# Patient Record
Sex: MALE | Race: WHITE | NOT HISPANIC OR LATINO | URBAN - METROPOLITAN AREA
[De-identification: names, ages, dates, MRNs, and addresses within clinical notes are randomized per-mention and may not be internally consistent; named-entity substitution may affect disease eponyms.]

---

## 2024-07-29 ENCOUNTER — APPOINTMENT (OUTPATIENT)
Age: 44
Setting detail: DERMATOLOGY
End: 2024-07-29

## 2024-07-29 ENCOUNTER — APPOINTMENT (RX ONLY)
Age: 44
Setting detail: DERMATOLOGY
End: 2024-07-29

## 2024-07-29 VITALS
SYSTOLIC BLOOD PRESSURE: 142 MMHG | DIASTOLIC BLOOD PRESSURE: 89 MMHG | SYSTOLIC BLOOD PRESSURE: 142 MMHG | DIASTOLIC BLOOD PRESSURE: 89 MMHG

## 2024-07-29 DIAGNOSIS — D22 MELANOCYTIC NEVI: ICD-10-CM

## 2024-07-29 DIAGNOSIS — F17.200 NICOTINE DEPENDENCE, UNSPECIFIED, UNCOMPLICATED: ICD-10-CM

## 2024-07-29 DIAGNOSIS — L82.1 OTHER SEBORRHEIC KERATOSIS: ICD-10-CM

## 2024-07-29 DIAGNOSIS — D485 NEOPLASM OF UNCERTAIN BEHAVIOR OF SKIN: ICD-10-CM

## 2024-07-29 DIAGNOSIS — D18.0 HEMANGIOMA: ICD-10-CM

## 2024-07-29 DIAGNOSIS — L81.4 OTHER MELANIN HYPERPIGMENTATION: ICD-10-CM

## 2024-07-29 PROBLEM — D22.61 MELANOCYTIC NEVI OF RIGHT UPPER LIMB, INCLUDING SHOULDER: Status: ACTIVE | Noted: 2024-07-29

## 2024-07-29 PROBLEM — D22.62 MELANOCYTIC NEVI OF LEFT UPPER LIMB, INCLUDING SHOULDER: Status: ACTIVE | Noted: 2024-07-29

## 2024-07-29 PROBLEM — D22.5 MELANOCYTIC NEVI OF TRUNK: Status: ACTIVE | Noted: 2024-07-29

## 2024-07-29 PROBLEM — D22.71 MELANOCYTIC NEVI OF RIGHT LOWER LIMB, INCLUDING HIP: Status: ACTIVE | Noted: 2024-07-29

## 2024-07-29 PROBLEM — D22.72 MELANOCYTIC NEVI OF LEFT LOWER LIMB, INCLUDING HIP: Status: ACTIVE | Noted: 2024-07-29

## 2024-07-29 PROBLEM — D48.5 NEOPLASM OF UNCERTAIN BEHAVIOR OF SKIN: Status: ACTIVE | Noted: 2024-07-29

## 2024-07-29 PROBLEM — D18.01 HEMANGIOMA OF SKIN AND SUBCUTANEOUS TISSUE: Status: ACTIVE | Noted: 2024-07-29

## 2024-07-29 PROCEDURE — 11102 TANGNTL BX SKIN SINGLE LES: CPT

## 2024-07-29 PROCEDURE — ? BIOPSY BY SHAVE METHOD

## 2024-07-29 PROCEDURE — ? SMOKING CESSATION COUNSELING

## 2024-07-29 PROCEDURE — 99203 OFFICE O/P NEW LOW 30 MIN: CPT | Mod: 25

## 2024-07-29 PROCEDURE — ? COUNSELING

## 2024-07-29 PROCEDURE — 11103 TANGNTL BX SKIN EA SEP/ADDL: CPT

## 2024-07-29 ASSESSMENT — LOCATION DETAILED DESCRIPTION DERM
LOCATION DETAILED: LEFT PROXIMAL PRETIBIAL REGION
LOCATION DETAILED: RIGHT PROXIMAL DORSAL FOREARM
LOCATION DETAILED: LEFT ANTERIOR PROXIMAL THIGH
LOCATION DETAILED: PERIUMBILICAL SKIN
LOCATION DETAILED: RIGHT SUPERIOR UPPER BACK
LOCATION DETAILED: RIGHT MEDIAL SUPERIOR CHEST
LOCATION DETAILED: LEFT DISTAL DORSAL FOREARM
LOCATION DETAILED: LEFT PROXIMAL PRETIBIAL REGION
LOCATION DETAILED: RIGHT DISTAL POSTERIOR UPPER ARM
LOCATION DETAILED: LEFT ELBOW
LOCATION DETAILED: RIGHT MEDIAL ELBOW
LOCATION DETAILED: LEFT MEDIAL INFERIOR CHEST
LOCATION DETAILED: LEFT MID-UPPER BACK
LOCATION DETAILED: RIGHT MEDIAL INFERIOR CHEST
LOCATION DETAILED: LEFT INFERIOR UPPER BACK
LOCATION DETAILED: LEFT MID-UPPER BACK
LOCATION DETAILED: EPIGASTRIC SKIN
LOCATION DETAILED: LEFT SUPERIOR MEDIAL LOWER BACK
LOCATION DETAILED: LEFT SUPERIOR UPPER BACK
LOCATION DETAILED: LEFT ANTERIOR PROXIMAL THIGH
LOCATION DETAILED: RIGHT PROXIMAL DORSAL FOREARM
LOCATION DETAILED: PERIUMBILICAL SKIN
LOCATION DETAILED: RIGHT ANTERIOR DISTAL THIGH
LOCATION DETAILED: LEFT DISTAL POSTERIOR UPPER ARM
LOCATION DETAILED: LEFT SUPERIOR MEDIAL LOWER BACK
LOCATION DETAILED: LEFT INFERIOR UPPER BACK
LOCATION DETAILED: RIGHT DISTAL PRETIBIAL REGION
LOCATION DETAILED: RIGHT DISTAL PRETIBIAL REGION
LOCATION DETAILED: RIGHT ANTERIOR PROXIMAL THIGH
LOCATION DETAILED: LEFT SUPERIOR UPPER BACK
LOCATION DETAILED: RIGHT MEDIAL ELBOW
LOCATION DETAILED: LEFT MEDIAL INFERIOR CHEST
LOCATION DETAILED: EPIGASTRIC SKIN
LOCATION DETAILED: LEFT KNEE
LOCATION DETAILED: LEFT KNEE
LOCATION DETAILED: RIGHT SUPERIOR UPPER BACK
LOCATION DETAILED: RIGHT PROXIMAL POSTERIOR UPPER ARM
LOCATION DETAILED: RIGHT MEDIAL SUPERIOR CHEST
LOCATION DETAILED: LEFT DISTAL POSTERIOR UPPER ARM
LOCATION DETAILED: RIGHT ANTERIOR PROXIMAL THIGH
LOCATION DETAILED: RIGHT PROXIMAL POSTERIOR UPPER ARM
LOCATION DETAILED: RIGHT ANTERIOR DISTAL THIGH
LOCATION DETAILED: LEFT ELBOW
LOCATION DETAILED: RIGHT DISTAL POSTERIOR UPPER ARM
LOCATION DETAILED: LEFT DISTAL DORSAL FOREARM
LOCATION DETAILED: RIGHT MEDIAL INFERIOR CHEST

## 2024-07-29 ASSESSMENT — LOCATION SIMPLE DESCRIPTION DERM
LOCATION SIMPLE: RIGHT PRETIBIAL REGION
LOCATION SIMPLE: LEFT LOWER BACK
LOCATION SIMPLE: RIGHT UPPER ARM
LOCATION SIMPLE: LEFT UPPER BACK
LOCATION SIMPLE: RIGHT UPPER BACK
LOCATION SIMPLE: LEFT ELBOW
LOCATION SIMPLE: LEFT PRETIBIAL REGION
LOCATION SIMPLE: LEFT UPPER BACK
LOCATION SIMPLE: LEFT THIGH
LOCATION SIMPLE: LEFT ELBOW
LOCATION SIMPLE: RIGHT ELBOW
LOCATION SIMPLE: RIGHT THIGH
LOCATION SIMPLE: LEFT THIGH
LOCATION SIMPLE: CHEST
LOCATION SIMPLE: RIGHT UPPER BACK
LOCATION SIMPLE: LEFT LOWER BACK
LOCATION SIMPLE: ABDOMEN
LOCATION SIMPLE: RIGHT FOREARM
LOCATION SIMPLE: CHEST
LOCATION SIMPLE: LEFT PRETIBIAL REGION
LOCATION SIMPLE: RIGHT THIGH
LOCATION SIMPLE: RIGHT PRETIBIAL REGION
LOCATION SIMPLE: RIGHT ELBOW
LOCATION SIMPLE: LEFT FOREARM
LOCATION SIMPLE: RIGHT FOREARM
LOCATION SIMPLE: RIGHT UPPER ARM
LOCATION SIMPLE: LEFT KNEE
LOCATION SIMPLE: ABDOMEN
LOCATION SIMPLE: LEFT FOREARM
LOCATION SIMPLE: LEFT KNEE
LOCATION SIMPLE: LEFT UPPER ARM
LOCATION SIMPLE: LEFT UPPER ARM

## 2024-07-29 ASSESSMENT — LOCATION ZONE DERM
LOCATION ZONE: LEG
LOCATION ZONE: TRUNK
LOCATION ZONE: TRUNK
LOCATION ZONE: ARM
LOCATION ZONE: ARM
LOCATION ZONE: LEG

## 2024-07-29 NOTE — PROCEDURE: MIPS QUALITY
Detail Level: Detailed
Quality 431: Preventive Care And Screening: Unhealthy Alcohol Use - Screening: Patient not identified as an unhealthy alcohol user when screened for unhealthy alcohol use using a systematic screening method
Quality 226: Preventive Care And Screening: Tobacco Use: Screening And Cessation Intervention: Patient screened for tobacco use, is a smoker AND received Cessation Counseling within measurement period or in the six months prior to the measurement period
Quality 47: Advance Care Plan: Advance Care Planning discussed and documented in the medical record; patient did not wish or was not able to name a surrogate decision maker or provide an advance care plan.

## 2024-07-29 NOTE — PROCEDURE: BIOPSY BY SHAVE METHOD
Curettage Text: The wound bed was treated with curettage after the biopsy was performed.
Validate Anticipated Plan: No
Billing Type: Third-Party Bill
Hemostasis: Aluminum Chloride
consent was obtained and risks were reviewed including but not limited to scarring, infection, bleeding, scabbing, incomplete removal, nerve damage and allergy to anesthesia.
Dressing: bandage
Electrodesiccation And Curettage Text: The wound bed was treated with electrodesiccation and curettage after the biopsy was performed.
Detail Level: Detailed
Cryotherapy Text: The wound bed was treated with cryotherapy after the biopsy was performed.
Additional Anesthesia Volume In Cc (Will Not Render If 0): 0
Notification Instructions: Patient will be notified of biopsy results. However, patient instructed to call the office if not contacted within 2 weeks.
Information: Selecting Yes will display possible errors in your note based on the variables you have selected. This validation is only offered as a suggestion for you. PLEASE NOTE THAT THE VALIDATION TEXT WILL BE REMOVED WHEN YOU FINALIZE YOUR NOTE. IF YOU WANT TO FAX A PRELIMINARY NOTE YOU WILL NEED TO TOGGLE THIS TO 'NO' IF YOU DO NOT WANT IT IN YOUR FAXED NOTE.
Anesthesia Type: 0.5% lidocaine with 1:200,000 epinephrine and a 1:10 solution of 8.4% sodium bicarbonate
Biopsy Method: double edge Personna blade
Depth Of Biopsy: dermis
Anesthesia Volume In Cc: 0.2
Biopsy Type: H and E
Electrodesiccation Text: The wound bed was treated with electrodesiccation after the biopsy was performed.
Was A Bandage Applied: Yes
Body Location Override (Optional - Billing Will Still Be Based On Selected Body Map Location If Applicable): left medial scapula
Wound Care: Vaseline
Type Of Destruction Used: Curettage
Size Of Lesion In Cm: 0.4
Post-Care Instructions: I reviewed with the patient in detail post-care instructions. Patient is to keep the biopsy site dry overnight, then wash with soap and water and apply ointment daily until healed.
Silver Nitrate Text: The wound bed was treated with silver nitrate after the biopsy was performed.
Size Of Lesion In Cm: 0.3
Body Location Override (Optional - Billing Will Still Be Based On Selected Body Map Location If Applicable): right distal tricep
Body Location Override (Optional - Billing Will Still Be Based On Selected Body Map Location If Applicable): right lateral elbow

## 2024-07-29 NOTE — PROCEDURE: BIOPSY BY SHAVE METHOD
Body Location Override (Optional - Billing Will Still Be Based On Selected Body Map Location If Applicable): left medial scapula
Detail Level: Detailed
Depth Of Biopsy: dermis
Was A Bandage Applied: Yes
Size Of Lesion In Cm: 0.3
X Size Of Lesion In Cm: 0
Biopsy Type: H and E
Biopsy Method: double edge Personna blade
Anesthesia Type: 0.5% lidocaine with 1:200,000 epinephrine and a 1:10 solution of 8.4% sodium bicarbonate
Anesthesia Volume In Cc: 0.2
Hemostasis: Aluminum Chloride
Wound Care: Vaseline
Dressing: bandage
Destruction After The Procedure: No
Type Of Destruction Used: Curettage
Curettage Text: The wound bed was treated with curettage after the biopsy was performed.
Cryotherapy Text: The wound bed was treated with cryotherapy after the biopsy was performed.
Electrodesiccation Text: The wound bed was treated with electrodesiccation after the biopsy was performed.
Electrodesiccation And Curettage Text: The wound bed was treated with electrodesiccation and curettage after the biopsy was performed.
Silver Nitrate Text: The wound bed was treated with silver nitrate after the biopsy was performed.
Lab: -[id number]
consent was obtained and risks were reviewed including but not limited to scarring, infection, bleeding, scabbing, incomplete removal, nerve damage and allergy to anesthesia.
Post-Care Instructions: I reviewed with the patient in detail post-care instructions. Patient is to keep the biopsy site dry overnight, then wash with soap and water and apply ointment daily until healed.
Notification Instructions: Patient will be notified of biopsy results. However, patient instructed to call the office if not contacted within 2 weeks.
Billing Type: Third-Party Bill
Information: Selecting Yes will display possible errors in your note based on the variables you have selected. This validation is only offered as a suggestion for you. PLEASE NOTE THAT THE VALIDATION TEXT WILL BE REMOVED WHEN YOU FINALIZE YOUR NOTE. IF YOU WANT TO FAX A PRELIMINARY NOTE YOU WILL NEED TO TOGGLE THIS TO 'NO' IF YOU DO NOT WANT IT IN YOUR FAXED NOTE.
Body Location Override (Optional - Billing Will Still Be Based On Selected Body Map Location If Applicable): right distal tricep
Size Of Lesion In Cm: 0.4
Body Location Override (Optional - Billing Will Still Be Based On Selected Body Map Location If Applicable): right lateral elbow

## 2024-07-29 NOTE — PROCEDURE: COUNSELING
Refill request  busPIRone (BUSPAR) 10 MG tablet 120 tablet 1 12/27/2022     LOV: 8/4/22 V-visit   Next OV: 4/17/23  Will route to MD guadalupe     
Detail Level: Zone
Detail Level: Detailed

## 2024-07-31 ENCOUNTER — APPOINTMENT (RX ONLY)
Age: 44
Setting detail: DERMATOLOGY
End: 2024-07-31

## 2024-07-31 ENCOUNTER — APPOINTMENT (OUTPATIENT)
Age: 44
Setting detail: DERMATOLOGY
End: 2024-07-31

## 2024-07-31 VITALS
DIASTOLIC BLOOD PRESSURE: 88 MMHG | SYSTOLIC BLOOD PRESSURE: 138 MMHG | DIASTOLIC BLOOD PRESSURE: 88 MMHG | SYSTOLIC BLOOD PRESSURE: 138 MMHG

## 2024-07-31 DIAGNOSIS — L72.8 OTHER FOLLICULAR CYSTS OF THE SKIN AND SUBCUTANEOUS TISSUE: ICD-10-CM

## 2024-07-31 PROBLEM — D48.5 NEOPLASM OF UNCERTAIN BEHAVIOR OF SKIN: Status: ACTIVE | Noted: 2024-07-31

## 2024-07-31 PROCEDURE — ? COUNSELING

## 2024-07-31 PROCEDURE — ? OBSERVATION AND MEASURE

## 2024-07-31 PROCEDURE — ? TREATMENT REGIMEN

## 2024-07-31 PROCEDURE — 99212 OFFICE O/P EST SF 10 MIN: CPT

## 2024-07-31 ASSESSMENT — LOCATION SIMPLE DESCRIPTION DERM
LOCATION SIMPLE: ABDOMEN
LOCATION SIMPLE: ABDOMEN

## 2024-07-31 ASSESSMENT — LOCATION ZONE DERM
LOCATION ZONE: TRUNK
LOCATION ZONE: TRUNK

## 2024-07-31 ASSESSMENT — LOCATION DETAILED DESCRIPTION DERM
LOCATION DETAILED: LEFT LATERAL ABDOMEN
LOCATION DETAILED: LEFT LATERAL ABDOMEN

## 2024-07-31 NOTE — PROCEDURE: MIPS QUALITY
Detail Level: Detailed
Quality 226: Preventive Care And Screening: Tobacco Use: Screening And Cessation Intervention: Patient screened for tobacco use, is a smoker AND received Cessation Counseling within measurement period or in the six months prior to the measurement period
Quality 431: Preventive Care And Screening: Unhealthy Alcohol Use - Screening: Patient not identified as an unhealthy alcohol user when screened for unhealthy alcohol use using a systematic screening method
Quality 47: Advance Care Plan: Advance Care Planning discussed and documented in the medical record; patient did not wish or was not able to name a surrogate decision maker or provide an advance care plan.

## 2024-07-31 NOTE — PROCEDURE: OBSERVATION
Body Location Override (Optional - Billing Will Still Be Based On Selected Body Map Location If Applicable): left lateral chest wall
Detail Level: Simple
Size Of Lesion: 2.2 cm

## 2024-07-31 NOTE — PROCEDURE: OBSERVATION
Detail Level: Simple
Body Location Override (Optional - Billing Will Still Be Based On Selected Body Map Location If Applicable): left lateral chest wall
Size Of Lesion: 2.2 cm

## 2024-10-14 ENCOUNTER — APPOINTMENT (OUTPATIENT)
Age: 44
Setting detail: DERMATOLOGY
End: 2024-10-14

## 2024-10-14 ENCOUNTER — APPOINTMENT (RX ONLY)
Age: 44
Setting detail: DERMATOLOGY
End: 2024-10-14

## 2024-10-14 VITALS
DIASTOLIC BLOOD PRESSURE: 90 MMHG | SYSTOLIC BLOOD PRESSURE: 150 MMHG | SYSTOLIC BLOOD PRESSURE: 150 MMHG | DIASTOLIC BLOOD PRESSURE: 90 MMHG

## 2024-10-14 DIAGNOSIS — D485 NEOPLASM OF UNCERTAIN BEHAVIOR OF SKIN: ICD-10-CM

## 2024-10-14 PROBLEM — D48.5 NEOPLASM OF UNCERTAIN BEHAVIOR OF SKIN: Status: ACTIVE | Noted: 2024-10-14

## 2024-10-14 PROCEDURE — ? SOFT TISSUE EXCISION

## 2024-10-14 PROCEDURE — 21930 EXC BACK LES SC < 3 CM: CPT

## 2024-10-14 PROCEDURE — ? PRESCRIPTION

## 2024-10-14 PROCEDURE — ? COUNSELING

## 2024-10-14 RX ORDER — OXYCODONE HYDROCHLORIDE AND ACETAMINOPHEN 5; 325 MG/1; MG/1
1 TABLET ORAL
Qty: 12 | Refills: 0 | Status: ERX | COMMUNITY
Start: 2024-10-14

## 2024-10-14 RX ORDER — HYDROCODONE BITARTRATE AND ACETAMINOPHEN 5; 325 MG/1; MG/1
1 TABLET ORAL
Qty: 12 | Refills: 0 | Status: CANCELLED | COMMUNITY
Start: 2024-10-14

## 2024-10-14 RX ADMIN — OXYCODONE HYDROCHLORIDE AND ACETAMINOPHEN 1: 5; 325 TABLET ORAL at 00:00

## 2024-10-14 RX ADMIN — HYDROCODONE BITARTRATE AND ACETAMINOPHEN 1: 5; 325 TABLET ORAL at 00:00

## 2024-10-14 ASSESSMENT — LOCATION ZONE DERM
LOCATION ZONE: TRUNK
LOCATION ZONE: TRUNK

## 2024-10-14 ASSESSMENT — LOCATION DETAILED DESCRIPTION DERM
LOCATION DETAILED: LEFT RIB CAGE
LOCATION DETAILED: LEFT RIB CAGE

## 2024-10-14 ASSESSMENT — LOCATION SIMPLE DESCRIPTION DERM
LOCATION SIMPLE: ABDOMEN
LOCATION SIMPLE: ABDOMEN

## 2024-10-14 NOTE — PROCEDURE: SOFT TISSUE EXCISION
Disclaimer: Primary Closures are bundled in Soft Tissue Excision cpt codes. If you feel complex repairs, flaps or graft closures are warranted you will have to document them separately and must justify your reasoning for adding these closures. You assume the risk of audit by doing so.
Body Location Override (Optional - Billing Will Still Be Based On Selected Body Map Location If Applicable): left lateral chest wall
Detail Level: Detailed
Insurance Zone (Required For Proper Billing): Trunk/Back
Size Of Lesion In Cm: 1.1
X Size Of Lesion In Cm (Optional): 0.9
Size Of Margin In Cm: 0.1
Excision Depth (Required For Proper Billing): subcutaneous tissue
Excision Method: Slit
Repair Type: Intermediate - Included in Soft Tissue Excision Code
Intermediate / Complex Repair - Final Wound Length In Cm: 3.3
Complex Requirements: Extensive Undermining Performed?: Yes
Distance Of Undermining In Cm (Required): 1
Undermining Type: Entire Wound
Complex Requirements: Exposure Of Vital Structure?: No
Debridement Text: The wound edges were debrided prior to proceeding with the closure to facilitate wound healing.
Helical Rim Text: The closure involved the helical rim.
Vermilion Border Text: The closure involved the vermilion border.
Nostril Rim Text: The closure involved the nostril rim.
Retention Suture Text: Retention sutures were placed to support the closure and prevent dehiscence.
Suture Removal: 14 days
Anesthesia Volume In Cc: 9
Scalpel Size: 15 blade
Anesthesia Type: 1% lidocaine with epinephrine
Hemostasis: Electrocautery
Estimated Blood Loss (Cc): minimal
Lab: -[id number]
Lab Facility: [id number]
Deep Sutures: 4-0 Polysorb
Number Of Deep Sutures (Optional): 4
Epidermal Sutures: 5-0 Monosof
Number Of Epidermal Sutures (Optional): 7
Epidermal Closure: simple interrupted
Wound Care: Petrolatum
Dressing: dry sterile dressing
Path Notes (To The Dermatopathologist): Please check margins.
Medical Necessity Clause: This procedure was medically necessary because the lesion that was treated was:
Consent was obtained from the patient. The risks and benefits to therapy were discussed in detail. Specifically, the risks of infection, scarring, bleeding, prolonged wound healing, incomplete removal, allergy to anesthesia, nerve injury and recurrence were addressed. Prior to the procedure, the treatment site was clearly identified and confirmed by the patient. All components of Universal Protocol/PAUSE Rule completed.
Post-Care Instructions: I reviewed with the patient in detail post-care instructions. Patient is not to engage in any heavy lifting, exercise, or swimming for the next 14 days. Should the patient develop any fevers, chills, bleeding, severe pain patient will contact the office immediately.
Billing Type: Third-Party Bill

## 2024-10-14 NOTE — PROCEDURE: MIPS QUALITY
Detail Level: Detailed
Quality 47: Advance Care Plan: Advance Care Planning discussed and documented in the medical record; patient did not wish or was not able to name a surrogate decision maker or provide an advance care plan.
Quality 226: Preventive Care And Screening: Tobacco Use: Screening And Cessation Intervention: Patient screened for tobacco use, is a smoker AND received Cessation Counseling within measurement period or in the six months prior to the measurement period
Quality 431: Preventive Care And Screening: Unhealthy Alcohol Use - Screening: Patient not identified as an unhealthy alcohol user when screened for unhealthy alcohol use using a systematic screening method

## 2024-10-14 NOTE — PROCEDURE: SOFT TISSUE EXCISION
Consent was obtained from the patient. The risks and benefits to therapy were discussed in detail. Specifically, the risks of infection, scarring, bleeding, prolonged wound healing, incomplete removal, allergy to anesthesia, nerve injury and recurrence were addressed. Prior to the procedure, the treatment site was clearly identified and confirmed by the patient. All components of Universal Protocol/PAUSE Rule completed.
Size Of Lesion In Cm: 1.1
Insurance Zone (Required For Proper Billing): Trunk/Back
Billing Type: Third-Party Bill
Dressing: dry sterile dressing
Anesthesia Type: 1% lidocaine with epinephrine
Number Of Epidermal Sutures (Optional): 7
Excision Depth (Required For Proper Billing): subcutaneous tissue
Complex Requirements: Retention Sutures?: No
Intermediate / Complex Repair - Final Wound Length In Cm: 3.3
Wound Care: Petrolatum
Detail Level: Detailed
Debridement Text: The wound edges were debrided prior to proceeding with the closure to facilitate wound healing.
Medical Necessity Clause: This procedure was medically necessary because the lesion that was treated was:
Body Location Override (Optional - Billing Will Still Be Based On Selected Body Map Location If Applicable): left lateral chest wall
Epidermal Sutures: 5-0 Monosof
Vermilion Border Text: The closure involved the vermilion border.
Size Of Margin In Cm: 0.1
Deep Sutures: 4-0 Polysorb
Estimated Blood Loss (Cc): minimal
Anesthesia Volume In Cc: 9
Disclaimer: Primary Closures are bundled in Soft Tissue Excision cpt codes. If you feel complex repairs, flaps or graft closures are warranted you will have to document them separately and must justify your reasoning for adding these closures. You assume the risk of audit by doing so.
Retention Suture Text: Retention sutures were placed to support the closure and prevent dehiscence.
Repair Type: Intermediate - Included in Soft Tissue Excision Code
Scalpel Size: 15 blade
Number Of Deep Sutures (Optional): 4
Add Superficial Fascia When Documenting Dermal Sutures?: Yes
Undermining Type: Entire Wound
Excision Method: Slit
Epidermal Closure: simple interrupted
X Size Of Lesion In Cm (Optional): 0.9
Hemostasis: Electrocautery
Nostril Rim Text: The closure involved the nostril rim.
Post-Care Instructions: I reviewed with the patient in detail post-care instructions. Patient is not to engage in any heavy lifting, exercise, or swimming for the next 14 days. Should the patient develop any fevers, chills, bleeding, severe pain patient will contact the office immediately.
Path Notes (To The Dermatopathologist): Please check margins.
Suture Removal: 14 days
Distance Of Undermining In Cm (Required): 1
Helical Rim Text: The closure involved the helical rim.

## 2024-10-14 NOTE — PROCEDURE: MIPS QUALITY
Quality 226: Preventive Care And Screening: Tobacco Use: Screening And Cessation Intervention: Patient screened for tobacco use, is a smoker AND received Cessation Counseling within measurement period or in the six months prior to the measurement period
Quality 47: Advance Care Plan: Advance Care Planning discussed and documented in the medical record; patient did not wish or was not able to name a surrogate decision maker or provide an advance care plan.
Quality 431: Preventive Care And Screening: Unhealthy Alcohol Use - Screening: Patient not identified as an unhealthy alcohol user when screened for unhealthy alcohol use using a systematic screening method
Detail Level: Detailed

## 2024-10-28 ENCOUNTER — APPOINTMENT (RX ONLY)
Age: 44
Setting detail: DERMATOLOGY
End: 2024-10-28

## 2024-10-28 ENCOUNTER — APPOINTMENT (OUTPATIENT)
Age: 44
Setting detail: DERMATOLOGY
End: 2024-10-28

## 2024-10-28 DIAGNOSIS — Z48.02 ENCOUNTER FOR REMOVAL OF SUTURES: ICD-10-CM

## 2024-10-28 DIAGNOSIS — Z48.817 ENCOUNTER FOR SURGICAL AFTERCARE FOLLOWING SURGERY ON THE SKIN AND SUBCUTANEOUS TISSUE: ICD-10-CM

## 2024-10-28 PROCEDURE — ? SUTURE REMOVAL (GLOBAL PERIOD)

## 2024-10-28 PROCEDURE — ? POST-OP WOUND CHECK

## 2024-10-28 ASSESSMENT — LOCATION ZONE DERM
LOCATION ZONE: TRUNK
LOCATION ZONE: TRUNK

## 2024-10-28 ASSESSMENT — LOCATION DETAILED DESCRIPTION DERM
LOCATION DETAILED: LEFT LATERAL ABDOMEN
LOCATION DETAILED: LEFT LATERAL ABDOMEN

## 2024-10-28 ASSESSMENT — LOCATION SIMPLE DESCRIPTION DERM
LOCATION SIMPLE: ABDOMEN
LOCATION SIMPLE: ABDOMEN

## 2024-10-28 NOTE — PROCEDURE: POST-OP WOUND CHECK
Body Location Override (Optional - Billing Will Still Be Based On Selected Body Map Location If Applicable): left lateral chest wall
Detail Level: Detailed
Add 20160 Cpt? (Important Note: In 2017 The Use Of 97844 Is Being Tracked By Cms To Determine Future Global Period Reimbursement For Global Periods): no
Additional Comments: Wound is healing in normal limits. Dermal stitches will heal fully in 2 months.

## 2024-10-28 NOTE — PROCEDURE: SUTURE REMOVAL (GLOBAL PERIOD)
Add 26142 Cpt? (Important Note: In 2017 The Use Of 76105 Is Being Tracked By Cms To Determine Future Global Period Reimbursement For Global Periods): no
Detail Level: Detailed
Body Location Override (Optional - Billing Will Still Be Based On Selected Body Map Location If Applicable): left lateral chest wall

## 2024-10-28 NOTE — PROCEDURE: SUTURE REMOVAL (GLOBAL PERIOD)
Body Location Override (Optional - Billing Will Still Be Based On Selected Body Map Location If Applicable): left lateral chest wall
Detail Level: Detailed
Add 30034 Cpt? (Important Note: In 2017 The Use Of 25637 Is Being Tracked By Cms To Determine Future Global Period Reimbursement For Global Periods): no

## 2024-10-28 NOTE — PROCEDURE: POST-OP WOUND CHECK
Body Location Override (Optional - Billing Will Still Be Based On Selected Body Map Location If Applicable): left lateral chest wall
Add 83752 Cpt? (Important Note: In 2017 The Use Of 58043 Is Being Tracked By Cms To Determine Future Global Period Reimbursement For Global Periods): no
Additional Comments: Wound is healing in normal limits. Dermal stitches will heal fully in 2 months.
Detail Level: Detailed